# Patient Record
Sex: FEMALE | Race: WHITE | Employment: UNEMPLOYED | URBAN - METROPOLITAN AREA
[De-identification: names, ages, dates, MRNs, and addresses within clinical notes are randomized per-mention and may not be internally consistent; named-entity substitution may affect disease eponyms.]

---

## 2020-02-18 ENCOUNTER — HOSPITAL ENCOUNTER (OUTPATIENT)
Age: 55
Setting detail: OUTPATIENT SURGERY
Discharge: HOME | End: 2020-02-18
Attending: SPECIALIST | Admitting: SPECIALIST
Payer: OTHER GOVERNMENT

## 2020-02-18 ENCOUNTER — ANESTHESIA EVENT (OUTPATIENT)
Dept: ENDOSCOPY | Age: 55
End: 2020-02-18
Payer: OTHER GOVERNMENT

## 2020-02-18 ENCOUNTER — ANESTHESIA (OUTPATIENT)
Dept: ENDOSCOPY | Age: 55
End: 2020-02-18
Payer: OTHER GOVERNMENT

## 2020-02-18 VITALS
HEIGHT: 61 IN | WEIGHT: 178.13 LBS | TEMPERATURE: 98.1 F | OXYGEN SATURATION: 98 % | RESPIRATION RATE: 21 BRPM | SYSTOLIC BLOOD PRESSURE: 125 MMHG | DIASTOLIC BLOOD PRESSURE: 78 MMHG | BODY MASS INDEX: 33.63 KG/M2 | HEART RATE: 82 BPM

## 2020-02-18 PROCEDURE — 77030034675 HC CAP BRAVO PH W DEL SYS GVIM -C: Performed by: SPECIALIST

## 2020-02-18 PROCEDURE — 74011250636 HC RX REV CODE- 250/636: Performed by: NURSE ANESTHETIST, CERTIFIED REGISTERED

## 2020-02-18 PROCEDURE — 74011250636 HC RX REV CODE- 250/636: Performed by: SPECIALIST

## 2020-02-18 PROCEDURE — 88305 TISSUE EXAM BY PATHOLOGIST: CPT

## 2020-02-18 PROCEDURE — 77030021593 HC FCPS BIOP ENDOSC BSC -A: Performed by: SPECIALIST

## 2020-02-18 PROCEDURE — 76040000019: Performed by: SPECIALIST

## 2020-02-18 PROCEDURE — 76060000031 HC ANESTHESIA FIRST 0.5 HR: Performed by: SPECIALIST

## 2020-02-18 PROCEDURE — 74011000250 HC RX REV CODE- 250: Performed by: NURSE ANESTHETIST, CERTIFIED REGISTERED

## 2020-02-18 RX ORDER — DEXTROMETHORPHAN/PSEUDOEPHED 2.5-7.5/.8
1.2 DROPS ORAL
Status: DISCONTINUED | OUTPATIENT
Start: 2020-02-18 | End: 2020-02-18 | Stop reason: HOSPADM

## 2020-02-18 RX ORDER — LIDOCAINE HYDROCHLORIDE 20 MG/ML
INJECTION, SOLUTION EPIDURAL; INFILTRATION; INTRACAUDAL; PERINEURAL AS NEEDED
Status: DISCONTINUED | OUTPATIENT
Start: 2020-02-18 | End: 2020-02-18 | Stop reason: HOSPADM

## 2020-02-18 RX ORDER — ROSUVASTATIN CALCIUM 40 MG/1
40 TABLET, COATED ORAL
COMMUNITY

## 2020-02-18 RX ORDER — FLUMAZENIL 0.1 MG/ML
0.2 INJECTION INTRAVENOUS
Status: DISCONTINUED | OUTPATIENT
Start: 2020-02-18 | End: 2020-02-18 | Stop reason: HOSPADM

## 2020-02-18 RX ORDER — MIDAZOLAM HYDROCHLORIDE 1 MG/ML
.25-5 INJECTION, SOLUTION INTRAMUSCULAR; INTRAVENOUS AS NEEDED
Status: DISCONTINUED | OUTPATIENT
Start: 2020-02-18 | End: 2020-02-18 | Stop reason: HOSPADM

## 2020-02-18 RX ORDER — PANTOPRAZOLE SODIUM 40 MG/1
40 TABLET, DELAYED RELEASE ORAL 2 TIMES DAILY
COMMUNITY

## 2020-02-18 RX ORDER — FENTANYL CITRATE 50 UG/ML
25 INJECTION, SOLUTION INTRAMUSCULAR; INTRAVENOUS AS NEEDED
Status: DISCONTINUED | OUTPATIENT
Start: 2020-02-18 | End: 2020-02-18 | Stop reason: HOSPADM

## 2020-02-18 RX ORDER — NALOXONE HYDROCHLORIDE 0.4 MG/ML
0.4 INJECTION, SOLUTION INTRAMUSCULAR; INTRAVENOUS; SUBCUTANEOUS
Status: DISCONTINUED | OUTPATIENT
Start: 2020-02-18 | End: 2020-02-18 | Stop reason: HOSPADM

## 2020-02-18 RX ORDER — PROPOFOL 10 MG/ML
INJECTION, EMULSION INTRAVENOUS AS NEEDED
Status: DISCONTINUED | OUTPATIENT
Start: 2020-02-18 | End: 2020-02-18 | Stop reason: HOSPADM

## 2020-02-18 RX ORDER — SODIUM CHLORIDE 9 MG/ML
50 INJECTION, SOLUTION INTRAVENOUS CONTINUOUS
Status: DISCONTINUED | OUTPATIENT
Start: 2020-02-18 | End: 2020-02-18 | Stop reason: HOSPADM

## 2020-02-18 RX ADMIN — SODIUM CHLORIDE 50 ML/HR: 900 INJECTION, SOLUTION INTRAVENOUS at 08:54

## 2020-02-18 RX ADMIN — PROPOFOL 30 MG: 10 INJECTION, EMULSION INTRAVENOUS at 09:13

## 2020-02-18 RX ADMIN — LIDOCAINE HYDROCHLORIDE 40 MG: 20 INJECTION, SOLUTION INTRAVENOUS at 09:06

## 2020-02-18 RX ADMIN — PROPOFOL 50 MG: 10 INJECTION, EMULSION INTRAVENOUS at 09:09

## 2020-02-18 RX ADMIN — PROPOFOL 20 MG: 10 INJECTION, EMULSION INTRAVENOUS at 09:11

## 2020-02-18 RX ADMIN — PROPOFOL 100 MG: 10 INJECTION, EMULSION INTRAVENOUS at 09:06

## 2020-02-18 NOTE — DISCHARGE INSTRUCTIONS
[address] [facility] KAVON Edouard MD  [phone]      February 18, 2020     Buck Camejo  YOB: 1965    ENDOSCOPY DISCHARGE INSTRUCTIONS    If there is redness at IV site you should apply warm compress to area. If redness or soreness persist contact Dr. Damon Edouard' or your primary care doctor. Gaseous discomfort may develop, but walking, belching will help relieve this. You may not operate a vehicle for 12 hours  You may not operate machinery or dangerous appliances for rest of today  You may not drink alcoholic beverages for 12 hours  Avoid making any critical decisions for 24 hours    DIET:  You may resume your normal diet, but some patients find that heavy or large meals may lead to indigestion or vomiting. I suggest a light meal as first food intake. MEDICATIONS:  The use of some over-the-counter pain medication may lead to bleeding after biopsies or other procedures you may have had done. Tylenol (also called acetaminophen) is safe to take and will not lead to bleeding. Based on the procedure you had today you may safely take aspirin or aspirin-like products for the next ten (10) days. ACTIVITY:  You may resume your normal household activities, but it is recommended that you spend the remainder of the day resting -  avoid any strenuous activity. CALL DR. Jerry Mendez' OFFICE IF:  Increasing pain, nausea, vomiting  Abdominal distension (swelling)  Significant new or increased bleeding (oral or rectal)  Fever/Chills  Chest pain/shortness of breath                   Additional instructions: We found a small hiatal hernia and visual evidence of acid reflux and have begin a 48 hour pH test.  Follow the pH recorder instructions as given and I'll contact you with the biopsy results and pH results when available. It was an honor to be your doctor today.   Please let me or my office staff know if you have any feedback about today's procedure.     Ellen Gibson MD

## 2020-02-18 NOTE — ANESTHESIA PREPROCEDURE EVALUATION
Relevant Problems   No relevant active problems       Anesthetic History   No history of anesthetic complications            Review of Systems / Medical History  Patient summary reviewed, nursing notes reviewed and pertinent labs reviewed    Pulmonary  Within defined limits                 Neuro/Psych   Within defined limits           Cardiovascular  Within defined limits                Exercise tolerance: >4 METS     GI/Hepatic/Renal     GERD           Endo/Other        Obesity     Other Findings              Physical Exam    Airway  Mallampati: II    Neck ROM: normal range of motion   Mouth opening: Normal     Cardiovascular  Regular rate and rhythm,  S1 and S2 normal,  no murmur, click, rub, or gallop  Rhythm: regular  Rate: normal         Dental  No notable dental hx       Pulmonary  Breath sounds clear to auscultation               Abdominal  GI exam deferred       Other Findings            Anesthetic Plan    ASA: 2  Anesthesia type: MAC          Induction: Intravenous  Anesthetic plan and risks discussed with: Patient

## 2020-02-18 NOTE — ANESTHESIA POSTPROCEDURE EVALUATION
Procedure(s):  ESOPHAGOGASTRODUODENOSCOPY (EGD) WITH BRAVO CLIP  ESOPHAGOGASTRODUODENAL (EGD) BIOPSY. MAC    Anesthesia Post Evaluation      Multimodal analgesia: multimodal analgesia not used between 6 hours prior to anesthesia start to PACU discharge  Patient location during evaluation: PACU  Patient participation: complete - patient participated  Level of consciousness: awake  Pain management: adequate  Airway patency: patent  Anesthetic complications: no  Cardiovascular status: acceptable, blood pressure returned to baseline and hemodynamically stable  Respiratory status: acceptable  Hydration status: acceptable  Post anesthesia nausea and vomiting:  controlled      Vitals Value Taken Time   /78 2/18/2020  9:45 AM   Temp 36.7 °C (98.1 °F) 2/18/2020  9:27 AM   Pulse 80 2/18/2020  9:46 AM   Resp 16 2/18/2020  9:46 AM   SpO2 100 % 2/18/2020  9:46 AM   Vitals shown include unvalidated device data.

## 2020-02-18 NOTE — INTERVAL H&P NOTE
Pre-Endoscopy H&P Update Chief complaint/HPI/ROS:  The indication for the procedure, the patient's history and the patient's current medications are reviewed prior to the procedure and that data is reported on the H&P to which this document is attached. Any significant complaints with regard to organ systems will be noted, and if not mentioned then a review of systems is not contributory. Past Medical History:  
Diagnosis Date  GERD (gastroesophageal reflux disease)  Ill-defined condition   
 hyperlipidemia Past Surgical History:  
Procedure Laterality Date [address] HX HYSTERECTOMY  2015 Social  
Social History Tobacco Use  Smoking status: Never Smoker  Smokeless tobacco: Never Used Substance Use Topics  Alcohol use: Not Currently Family History Problem Relation Age of Onset  Lung Disease Mother No Known Allergies Prior to Admission Medications Prescriptions Last Dose Informant Patient Reported? Taking? pantoprazole (PROTONIX) 40 mg tablet 2/17/2020 at Unknown time  Yes Yes Sig: Take 40 mg by mouth two (2) times a day. rosuvastatin (CRESTOR) 40 mg tablet 2/17/2020 at Unknown time  Yes Yes Sig: Take 40 mg by mouth nightly. Facility-Administered Medications: None PHYSICAL EXAM:  The patient is examined immediately prior to the procedure. Visit Vitals /82 Pulse 84 Temp 98.1 °F (36.7 °C) Resp 14 Ht 5' 1\" (1.549 m) Wt 80.8 kg (178 lb 2.1 oz) SpO2 97% BMI 33.66 kg/m² Gen: Appears comfortable, no distress. Pulm: comfortable respirations with no abnormal audible breath sounds HEART: well perfused, no abnormal audible heart sounds GI: abdomen flat. PLAN:  Informed consent discussion held, patient afforded an opportunity to ask questions and all questions answered.   After being advised of the risks, benefits, and alternatives, the patient requested that we proceed and indicated so on a written consent form. Will proceed with procedure as planned.  
Emily Jackson MD

## 2020-02-18 NOTE — PROGRESS NOTES
Cristobal [facility]  1965  [id number]    Situation:  Verbal report received from:   Harjinder Spencer RN  Procedure: Procedure(s):  ESOPHAGOGASTRODUODENOSCOPY (EGD) WITH BRAVO CLIP  ESOPHAGOGASTRODUODENAL (EGD) BIOPSY    Background:    Preoperative diagnosis: ATYPICAL CHEST PAIN, ANEMIA, IRON DEFICIENCY  Postoperative diagnosis: hiatal hernia, esophagitis. :  Dr. Melania Arnold   Assistant(s): Endoscopy Technician-1: Pascual Meléndez  Endoscopy Technician-2: Jhoan Leyva  Endoscopy RN-1: Neva Dixon RN    Specimens:   ID Type Source Tests Collected by Time Destination   1 : ge junction Preservative   Satnam Rojas MD 2/18/2020 0911 Pathology     H. Pylori  no    Assessment:  Intra-procedure medications       Anesthesia gave intra-procedure sedation and medications, see anesthesia flow sheet yes    Intravenous fluids: NS@ KVO     Vital signs stable   yes    Abdominal assessment: round and soft   yes    Recommendation:  Discharge patient per MD order  yes.   Return to floor  outpatient  Family or Friend   spouse  Permission to share finding with family or friend yes

## 2020-02-18 NOTE — PROCEDURES
[address]  [phone]      2020    Esophagogastroduodenoscopy (EGD) Procedure Note  Tobi Jo  : 1965  [facility] Medical Record Number: [id number]      Indications:    Chest pain, GERD  Referring Physician:  Matheus Egan NP  Anesthesia/Sedation:  Conscious sedation/deep sedation/monitored anesthesia -- see notes. Endoscopist:  Dr. Sadaf Newman  Complications:  None  Estimated Blood Loss:  None    Permit:  The indications, risks, benefits and alternatives were reviewed with the patient or their decision maker who was provided an opportunity to ask questions and all questions were answered. The specific risks of esophagogastroduodenoscopy with conscious sedation were reviewed, including but not limited to anesthetic complication, bleeding, adverse drug reaction, missed lesion, infection, IV site reactions, and intestinal perforation which would lead to the need for surgical repair. Alternatives to EGD including radiographic imaging, observation without testing, or laboratory testing were reviewed as well as the limitations of those alternatives discussed. After considering the options and having all their questions answered, the patient or their decision maker provided both verbal and written consent to proceed. Procedure in Detail:  After obtaining informed consent, positioning of the patient in the left lateral decubitus position, and conduction of a pre-procedure pause or \"time out\" the endoscope was introduced into the mouth and advanced to the duodenum. A careful inspection was made, and findings or interventions are described below. Findings:   Esophagus: There is a small hiatus hernia displacing the TGF/Z line to 38cm from the teeth. The diaphragmatic pinch is at 40cm.   At the Z line there is esophagitis characterized by mucosal breaks crossing adjacent folds with one small ulceration, total circumference is less than 50%. Cold forceps biopsies taken from the GE junction and BRAVO pH probe is attached 5cm above the Z line and photodocumentation of location obtained. Stomach: normal aside from 2cm hiatus hernia. Duodenum/jejunum: normal      Specimens: See above    Impression: Hiatus hernia and [place] class C erosive esophagitis. Recommendations:  -Await pathology. ,   -Proceed with 48 hour pH analysis, whilst taking acid suppressing medication as was the plan from her referring gastroenterologist.      Thank you for entrusting me with this patient's care. Please do not hesitate to contact me with any questions or if I can be of assistance with any of your other patients' GI needs. Signed By: Luiza Wells MD                        February 18, 2020     Surgical assistant none. Implants none unless specified.

## 2020-02-18 NOTE — H&P
Date: 2020 3:30 PM   Patient Name: Erica Robbins   Account #: [id number]    Gender: Female    (age): 1965 (49)       Provider:     Lara Benitez MD        Referring Physician:     Hanh [address], [place], [address]   [phone] (phone)  [phone] (fax)        Chief Complaint: Still having gerd symptoms           History of Present Illness:   it was my pleasure to see Ms. Jaja Gunn back in the office today. This very pleasant patient was seen a year and a half ago with burning in her chest and regurgitation and vomiting. She was taking pantoprazole once a day. An EGD looked good. An ultrasound showed gallstones. She increased her to pantoprazole to twice a day but has had intermittent problems. She did go for days or weeks without symptoms and then the burning in her chest returns. It is associated with nausea and regurgitation and vomiting. she occasionally has discomfort in the right upper quadrant radiating to the back. At this point I think it's quite likely that her symptoms are actually biliary colic although somewhat atypical.  I would like to arrange a Bravo PH study while on her BID PPi. If there is no sign of acid reflux on this test I will refer her to a surgeon for laparoscopic cholecystectomy. ? it was my pleasure to see Ms. Jaja Gunn back in the office today. This very pleasant patient was seen a year and a half ago with burning in her chest and regurgitation and vomiting. She was taking pantoprazole once a day. An EGD looked good. An ultrasound showed gallstones. She increased her to pantoprazole to twice a day? but has had intermittent problems. She did go for days or weeks without symptoms and then the burning in her chest returns. It is associated with nausea and regurgitation and vomiting. she occasionally has discomfort in the right upper quadrant radiating to the back.   At this point I think it's quite likely that her symptoms are actually biliary colic although somewhat atypical.  I would like to arrange a Bravo PH study while on her BID PPi. If there is no sign of acid reflux on this test I will refer her to a surgeon for laparoscopic cholecystectomy. Past Medical History      Medical Conditions: High cholesterol  Kidney disease   Surgical Procedures: Hysterectomy, 1990  2 C-sections, 9432/4915   Dx Studies: Abdominal U/S, 06/2019  Colonoscopy, 6/2015  Endoscopy, 08/20/2018   Medications: pantoprazole 40 mg Take 1 tablet by mouth twice a day before meals  rosuvastatin 40 mg 1po qd   Allergies: Patient has no known allergies or drug allergies   Immunizations: Flu vaccine (refused), 01/06/2020  TB Test, 5/2005  zoster      Social History      Alcohol: Alcohol consumption: Daily. Beer 4 beers a week. Tobacco: Former smokerCigarettes 10 cigarettes a day, quit 1997. Drugs: None   Exercise: Walking/ Running. Caffeine: None   Marital Status:          Occupation:               Family History No Knowledge Of Family History       Review of Systems:   Cardiovascular: Denies chest pain, irregular heart beat, palpitations, peripheral edema, syncope, Sweats. Constitutional: Presents suffers from weight gain, weight loss. Denies fatigue, fever, loss of appetite. ENMT: Presents suffers from sore throat. Denies nose bleeds, hearing loss. Endocrine: Denies excessive thirst, heat intolerance. Eyes: Presents suffers from loss of vision. .    Gastrointestinal: Presents suffers from abdominal pain, heartburn, nausea, vomiting. Denies abdominal swelling, change in bowel habits, constipation, diarrhea, Bloating/gas, jaundice, rectal bleeding, stomach cramps, dysphagia, rectal pain, Stool incontinence, hematemesis. Genitourinary: Denies dark urine, dysuria, frequent urination, hematuria, incontinence. Hematologic/Lymphatic: Denies easy bruising, prolonged bleeding. Integumentary: Denies itching, rashes, sun sensitivity.    Musculoskeletal: Presents suffers from back pain. Denies arthritis, gout, joint pain, muscle weakness, stiffness. Neurological: Denies dizziness, fainting, frequent headaches, memory loss. Psychiatric: Denies anxiety, depression, difficulty sleeping, hallucinations, nervousness, panic attacks, paranoia. Respiratory: Presents suffers from cough, dyspnea, wheezing. .       Vital Signs:   BP  (mmHg)  Pulse  (ppm) Weight (lbs/oz) Height (ft/in) BMI Resp/min Temp   124/90 (G)  122/93 (L) 86 177 /  5 / 1 33.44 12 98.4 (F)      Physical Exam:   Constitutional:      Appearance: No distress, appears comfortable. Communication: Understands/receives spoken information. Skin:      Inspection: No rash, no jaundice. Palpation: No subcutaneous nodules. Head/face: Inspection: Normacephalic, atraumatic. Palpation: normal.   Eyes:      Conjunctivae/lids: Normal.   Pupils/irises: Pupils equal, round and normal.   ENMT:      External: Normal.   Hearing: Normal.   Neck:      Neck: Normal appearance, trachea midline. Jugular veins: No JVD noted. Respiratory:      Effort: Normal respiratory effort, comfortable, speaks in complete sentences. Auscultation: normal breath sounds, no rubs, wheezes or rhonchi. Cardiovascular:      Palpation: normal size, PMI is palpable in the 5th intercostal space, left midclavicular line, normal rythym. Auscultation: normal, S1 and S2, no gallops , no rubs or murmurs . Gastrointestinal/Abdomen:      Abdomen: non-distended, nontender. Liver/Spleen: normal, normal size, Liver size and consistency normal, spleen is non-palpable. Musculoskeletal:      Gait/station: normal.   Digits/nails: Normal, no spooning of nails, clubbing, or splinter hemorrhages, no clubbing, cyanosis, petechiae or other inflammatory conditions. Back: no kyphosis or scoliosis. Muscles: normal strength and tone, no atrophy or abnormal movements.    Psychiatric:      Judgment/insight: Normal, normal judgement, normal insight. Orientation: oriented to time, space and person. Memory: normal short term memory, normal long term memory, no memory loss. Mood and affect: Normal mood, affect full, no evidence of depression, anxiety or agitation. Lymphatic:      Neck: No lymphadenopathy in the cervical or supraclavicular chain. Other: No periumbilic lymphadenopathy. Lab Results:      Test 2018 Units Limits   PYLORITEK      BY [id number]     DATE [id number]     PYLORI PAT RESULT NEGATIVE     TIME 1510       Impressions: Atypical chest pain? ? Atypical chest pain? Assessment: suspect atypical biliary colic versus resistant GERD which I think is less likely she will have a pH study of her esophagus and if this does not show acid reflux I will refer her to surgery for laparoscopic cholecystectomy  ? suspect atypical biliary colic versus resistant GERD which I think is less likely she will have a pH study of her esophagus and if this does not show acid reflux I will refer her to surgery for laparoscopic cholecystectomy        Plan: Education Handout -Smoking/Benefits of Quitting  Education handout on BMI Healthy Weight  Education handout on Hypertension  Upper Endoscopy with Bravo PH - on her twice a day proton pump inhibitor? ?Education Handout -Smoking/Benefits of Quitting? ?  ? ? Education handout on BMI Healthy Weight? ?  ? ? Education handout on Hypertension? ?  ? ? Upper Endoscopy with Bravo PH - on her twice a day proton pump inhibitor        Risk & Medical Necessity: The patient requires Low to Moderate Severity care for this visit. Diagnosis and management options are Minimal. The amount of data reviewed and/or ordered is Minimal/None. The level of risk is Minimal.           Notes:              Isidoro Bernardo MD     Electronically signed on 2020 3:59:45 PM by Laurie Orta.  Lucio Bernardo MD                                 [address] [place], [place] [id number], MRN [id number],  1965 Follow Up, 2020

## 2020-02-18 NOTE — PERIOP NOTES
3841  Anesthesia staff at patient's bedside administering anesthesia and monitoring patients vital signs throughout procedure. See anesthesia note. 0913  Bravo clip placed in esophagus at 32 on scope. 5310  Endoscope was pre-cleaned at bedside immediately following procedure by alaina Bragg. 3750  Patient tolerated procedure without problems. Abdomen soft and patient arousable and voices no complaints Report received from CRNA, see anesthesia note. Patient transported to endoscopy recovery area. Report given to IMAN ROUSE RN.

## 2020-03-06 NOTE — PROGRESS NOTES
BRAVO pH report    48 hour study  Performed while patient taking acid suppressing medications    Acid reflux analysis:  Total time in reflux (pH<4) 41 minutes  41 total reflux events  % time in reflux 0.9%  Longest reflux event 8 minutes    DeMeester score: 6.5 (>14.72 considered significant)    Symptom analysis:   Data for heartburn and regurgitation do not meet significance. A/P:  This study shows that acid exposure in the esophagus seems controlled by current medications. Consider alternate explanation for patients symptoms (alternate to acid exposure in esophagus).   Carloz Negrete MD

## (undated) DEVICE — Device

## (undated) DEVICE — NEONATAL-ADULT SPO2 SENSOR: Brand: NELLCOR

## (undated) DEVICE — FORCEPS BX L240CM JAW DIA2.8MM L CAP W/ NDL MIC MESH TOOTH

## (undated) DEVICE — CATH IV AUTOGRD BC PNK 20GA 25 -- INSYTE

## (undated) DEVICE — SOLIDIFIER MEDC 1200ML -- CONVERT TO 356117

## (undated) DEVICE — 3M™ CUROS™ DISINFECTING CAP FOR NEEDLELESS CONNECTORS 270/CARTON 20 CARTONS/CASE CFF1-270: Brand: CUROS™

## (undated) DEVICE — CONTAINER SPEC 20 ML LID NEUT BUFF FORMALIN 10 % POLYPR STS

## (undated) DEVICE — ELECTRODE,RADIOTRANSLUCENT,FOAM,5PK: Brand: MEDLINE

## (undated) DEVICE — 1200 GUARD II KIT W/5MM TUBE W/O VAC TUBE: Brand: GUARDIAN

## (undated) DEVICE — BAG SPEC BIOHZRD 10 X 10 IN --

## (undated) DEVICE — BLOCK BITE ENDOSCP AD 21 MM W/ DIL BLU LF DISP

## (undated) DEVICE — Z DISCONTINUED NO SUB IDED CAPSULE PH GASTROENTEROLOGY ES MON FOR REFLX DEL SYS BRAVO